# Patient Record
Sex: FEMALE | Race: WHITE
[De-identification: names, ages, dates, MRNs, and addresses within clinical notes are randomized per-mention and may not be internally consistent; named-entity substitution may affect disease eponyms.]

---

## 2019-04-30 ENCOUNTER — HOSPITAL ENCOUNTER (OUTPATIENT)
Dept: HOSPITAL 10 - SDS | Age: 39
Discharge: HOME | End: 2019-04-30
Attending: OBSTETRICS & GYNECOLOGY
Payer: MEDICAID

## 2019-04-30 ENCOUNTER — HOSPITAL ENCOUNTER (OUTPATIENT)
Dept: HOSPITAL 91 - SDS | Age: 39
Discharge: HOME | End: 2019-04-30
Payer: MEDICAID

## 2019-04-30 VITALS — HEART RATE: 60 BPM | DIASTOLIC BLOOD PRESSURE: 70 MMHG | SYSTOLIC BLOOD PRESSURE: 101 MMHG | RESPIRATION RATE: 13 BRPM

## 2019-04-30 VITALS — DIASTOLIC BLOOD PRESSURE: 72 MMHG | SYSTOLIC BLOOD PRESSURE: 104 MMHG | HEART RATE: 66 BPM | RESPIRATION RATE: 14 BRPM

## 2019-04-30 VITALS — HEART RATE: 68 BPM | RESPIRATION RATE: 12 BRPM | SYSTOLIC BLOOD PRESSURE: 111 MMHG | DIASTOLIC BLOOD PRESSURE: 69 MMHG

## 2019-04-30 VITALS — DIASTOLIC BLOOD PRESSURE: 72 MMHG | HEART RATE: 70 BPM | RESPIRATION RATE: 15 BRPM | SYSTOLIC BLOOD PRESSURE: 108 MMHG

## 2019-04-30 VITALS — HEART RATE: 58 BPM | RESPIRATION RATE: 14 BRPM | DIASTOLIC BLOOD PRESSURE: 77 MMHG | SYSTOLIC BLOOD PRESSURE: 108 MMHG

## 2019-04-30 VITALS — HEART RATE: 62 BPM | SYSTOLIC BLOOD PRESSURE: 107 MMHG | RESPIRATION RATE: 13 BRPM | DIASTOLIC BLOOD PRESSURE: 72 MMHG

## 2019-04-30 VITALS — DIASTOLIC BLOOD PRESSURE: 65 MMHG | SYSTOLIC BLOOD PRESSURE: 109 MMHG | RESPIRATION RATE: 15 BRPM | HEART RATE: 60 BPM

## 2019-04-30 VITALS — HEART RATE: 76 BPM | DIASTOLIC BLOOD PRESSURE: 72 MMHG | SYSTOLIC BLOOD PRESSURE: 113 MMHG | RESPIRATION RATE: 16 BRPM

## 2019-04-30 VITALS — RESPIRATION RATE: 12 BRPM | HEART RATE: 60 BPM | SYSTOLIC BLOOD PRESSURE: 109 MMHG | DIASTOLIC BLOOD PRESSURE: 75 MMHG

## 2019-04-30 VITALS — HEART RATE: 63 BPM | RESPIRATION RATE: 16 BRPM | SYSTOLIC BLOOD PRESSURE: 130 MMHG | DIASTOLIC BLOOD PRESSURE: 78 MMHG

## 2019-04-30 VITALS — SYSTOLIC BLOOD PRESSURE: 112 MMHG | RESPIRATION RATE: 15 BRPM | HEART RATE: 84 BPM | DIASTOLIC BLOOD PRESSURE: 72 MMHG

## 2019-04-30 VITALS
WEIGHT: 139.99 LBS | BODY MASS INDEX: 25.76 KG/M2 | WEIGHT: 139.99 LBS | HEIGHT: 62 IN | BODY MASS INDEX: 25.76 KG/M2 | HEIGHT: 62 IN

## 2019-04-30 VITALS — RESPIRATION RATE: 14 BRPM | DIASTOLIC BLOOD PRESSURE: 70 MMHG | HEART RATE: 68 BPM | SYSTOLIC BLOOD PRESSURE: 111 MMHG

## 2019-04-30 VITALS — HEART RATE: 62 BPM | DIASTOLIC BLOOD PRESSURE: 74 MMHG | RESPIRATION RATE: 11 BRPM | SYSTOLIC BLOOD PRESSURE: 108 MMHG

## 2019-04-30 DIAGNOSIS — Z30.2: Primary | ICD-10-CM

## 2019-04-30 LAB
ADD MAN DIFF?: NO
BASOPHIL #: 0 10^3/UL (ref 0–0.1)
BASOPHILS %: 0.3 % (ref 0–2)
EOSINOPHILS #: 0.1 10^3/UL (ref 0–0.5)
EOSINOPHILS %: 1.7 % (ref 0–7)
HEMATOCRIT: 38.6 % (ref 37–47)
HEMOGLOBIN: 12.9 G/DL (ref 12–16)
IMMATURE GRANS #M: 0.02 10^3/UL (ref 0–0.03)
IMMATURE GRANS % (M): 0.3 % (ref 0–0.43)
INR: 1.02
LYMPHOCYTES #: 1.6 10^3/UL (ref 0.8–2.9)
LYMPHOCYTES %: 22.7 % (ref 15–51)
MEAN CORPUSCULAR HEMOGLOBIN: 29.7 PG (ref 29–33)
MEAN CORPUSCULAR HGB CONC: 33.4 G/DL (ref 32–37)
MEAN CORPUSCULAR VOLUME: 88.7 FL (ref 82–101)
MEAN PLATELET VOLUME: 11.3 FL (ref 7.4–10.4)
MONOCYTE #: 0.6 10^3/UL (ref 0.3–0.9)
MONOCYTES %: 8 % (ref 0–11)
NEUTROPHIL #: 4.7 10^3/UL (ref 1.6–7.5)
NEUTROPHILS %: 67 % (ref 39–77)
NUCLEATED RED BLOOD CELLS #: 0 10^3/UL (ref 0–0)
NUCLEATED RED BLOOD CELLS%: 0 /100WBC (ref 0–0)
PARTIAL THROMBOPLASTIN TIME: 33.2 SEC (ref 23–35)
PLATELET COUNT: 242 10^3/UL (ref 140–415)
PROTIME: 13.5 SEC (ref 11.9–14.9)
PT RATIO: 1.1
RED BLOOD COUNT: 4.35 10^6/UL (ref 4.2–5.4)
RED CELL DISTRIBUTION WIDTH: 13.7 % (ref 11.5–14.5)
WHITE BLOOD COUNT: 7 10^3/UL (ref 4.8–10.8)

## 2019-04-30 PROCEDURE — 85610 PROTHROMBIN TIME: CPT

## 2019-04-30 PROCEDURE — 84703 CHORIONIC GONADOTROPIN ASSAY: CPT

## 2019-04-30 PROCEDURE — 85730 THROMBOPLASTIN TIME PARTIAL: CPT

## 2019-04-30 PROCEDURE — 85025 COMPLETE CBC W/AUTO DIFF WBC: CPT

## 2019-04-30 PROCEDURE — 58670 LAPAROSCOPY TUBAL CAUTERY: CPT

## 2019-04-30 RX ADMIN — ACETAMINOPHEN 1 MG: 500 TABLET, FILM COATED ORAL at 14:11

## 2019-04-30 RX ADMIN — BUTORPHANOL TARTRATE 1 MG: 2 INJECTION, SOLUTION INTRAMUSCULAR; INTRAVENOUS at 14:12

## 2019-04-30 RX ADMIN — BUPIVACAINE HYDROCHLORIDE AND EPINEPHRINE BITARTRATE 1 ML: 2.5; .005 INJECTION, SOLUTION EPIDURAL; INFILTRATION; INTRACAUDAL; PERINEURAL at 12:00

## 2019-04-30 RX ADMIN — ONDANSETRON HYDROCHLORIDE 1 MG: 2 INJECTION, SOLUTION INTRAMUSCULAR; INTRAVENOUS at 14:11

## 2019-04-30 RX ADMIN — HYDROMORPHONE HYDROCHLORIDE 1 MG: 2 INJECTION INTRAMUSCULAR; INTRAVENOUS; SUBCUTANEOUS at 14:11

## 2019-04-30 RX ADMIN — ACETAMINOPHEN 1 MG: 500 TABLET, FILM COATED ORAL at 10:59

## 2019-04-30 RX ADMIN — DOXYCYCLINE HYCLATE 1 MG: 100 TABLET, COATED ORAL at 14:11

## 2019-04-30 NOTE — OPR
Operative Report


Planned Procedure


Procedure date


Apr 30, 2019


Procedure(s)


Bilateral tubal fulguration via laparoscope


Performed by


see signature line


Anesthesiologist:  CHELA NOEL


Pre-procedure diagnosis


Multiparity with desire for sterilization


                 [id number]
Anesthesia Type:  [id number]
general








Post-Procedure


Post-procedure diagnosis


Status post bilateral tubal fulguration


Findings


Live Baby [], Apgars [] and [], weight [], position [], [] presentation []cord.


Estimated Blood Loss:  minimal


Specimen(s)


none


Grafts/Implant(s)


none


Complication(s)


none


Pt Condition post procedure:  stable


Disposition:  PACU


Procedure Description


The patient was placed on the OR table in the supine position.  General 


anesthesia was induced.  The patient was turned into lithotomy position for 


vaginal and laparoscopic procedure specifically.  Perineal, vaginal, and 


abdominal area were then prepped with Betadine and draped for a usual laparoscop


ic procedure and a vaginal procedure.  A Land catheter was then inserted into 


urinary bladder under aseptic condition in operating room and under satisfactory


 anesthesia, a small speculum was inserted into vagina.  Anterior lip of the 


cervix was secured with a tenaculum.  Cervix was brought down to operative 


field.  It was progressively dilated to #6 Hegar.  A HUMI elevator was inserted 


into cervical canal and afterwards uterine cavity.  After insufflation of the 


tube all the other instruments were removed from vaginal cavity.  After changing


 gloves, turning to abdominal side, a small incision was placed just below belly


 button 0.5 cm in length.  A 0.5 cm trocar was introduced inside the incision.  


The trocar was blunt and pointing toward the uterine dome.  The trocar was 


easily inserted inside the abdominal.  A laparoscope was then inserted into the 


abdominal cavity, making sure the correct cavity was entered.  Intra-abdominal 


cavity was insufflated with CO2.  Under direct visualization a small incision 


was made a 0.5 cm in length about 2 to 3 fingerbreadths above and parallel to 


the symphysis pubis.  A 0.5 cm trocar was then introduced inside the incision.  


Under direct visualization the second probe was also inserted into abdominal 


cavity easily.  The uterus and fallopian tubes were easily identified.  Right 


fallopian tube was approached first and at least 5 cm of the tube was adequately


 fulgurated, making sure no live tissue was left in between.  The same procedure


 was done on the left side.  Serious care was taken to avoid bowel, bladder, or 


other intra-abdominal organ injury.  At this point, procedure was terminated.  


The trocar incision sites from inside the abdomen on either side were observed. 


 No bleeding was observed.  After removing the laparoscope, the abdomen was 


desufflated to its normal position.  Afterwards, all of the trocar sleeves were 


removed.  Abdominal incisions were closed using staples.  The HUMI was then 


discontinued.  Also, Land was taken out.  The patient was returned to supine 


position.  Estimated blood loss was less than 5 mL.  The patient tolerated the 


procedure very well and was transferred to postanesthesia recovery room in 


stable and good condition.











BETITO VILLA MD   Apr 30, 2019 13:24

## 2019-04-30 NOTE — PREAC
Date/Time of Note


Date/Time of Note


DATE: 4/30/19 


TIME: 11:25





Anesthesia Eval and Record


Evaluation


Time Pre-Procedure Interview


DATE: 4/30/19 


TIME: 11:25


Age


38


Sex


female


NPO:  8 hrs


Preoperative diagnosis


elective sterilization


Planned procedure


Laparoscopic tubal ligation





Past Medical History


Past Medical History:  None





Surgery & Anesthesia Issues


No known issue





Meds


Anticoagulation:  No


Beta Blocker within 24 hr:  No


Reason Beta Blocker not given:  Pt. not on B-Blocker


Discontinued Reported Medications


Prenatal Vit-Iron Fumarate-FA (Prenatal Tablet) 1 Each Tablet, 1 TAB PO DAILY, 


TAB


   11/12/14


Meds reviewed:  Yes





Allergies


Coded Allergies:  


     ibuprofen (Verified  Allergy, Unknown, RASH, PRURITIS, 4/30/19)


Allergies Reviewed:  Yes





Labs/Studies


Labs Reviewed:  Reviewed by anesthesiologist





Pregnancy test:  Negative





Pre-procedure Exam


Last vitals





Vital Signs


  Date      Temp  Pulse  Resp  B/P (MAP)   Pulse Ox  O2          O2 Flow    FiO2


Time                                                 Delivery    Rate


   4/30/19  97.5     63    16      130/78        97  Room Air


     11:15                           (95)





Airway:  Adequate mouth opening, Adequate thyromental dist


Mallampati:  Mallampati II


Teeth:  Normal


Lung:  Normal


Heart:  Normal





ASA Physical Status


ASA physical status:  1


Emergency:  None





Planned Anesthetic


General/MAC:  ETT





Pre-operative Attestations


Prior to commencing anesthesia and surgery, the patient was re-evaluated, there 


was verification of:


*The patient's identity


*The results of appropriate recent lab work and preoperative vital signs


*The above evaluation not changing prior to induction


*Anesthetic plan, risk benefits, alternative and complications discussed with 


patient/family; questions answered; patient/family understands, accepts and 


wishes to proceed.











CHELA NOEL                  Apr 30, 2019 11:28

## 2019-04-30 NOTE — PAC
Date/Time of Note


Date/Time of Note


DATE: 4/30/19 


TIME: 13:38





Post-Anesthesia Notes


Post-Anesthesia Note


Last documented vital signs





Vital Signs


  Date     Temp     Pulse  Resp   B/P (MAP)  Pulse Ox  O2         O2 Flow   FiO2


Time                                                   Delivery   Rate


  4/30/19  97.5
98  63
89  16
16     130/78    97
100  Room


11:15
133                         (95)
111/            Air
face


        3                                76            mask 6L





Activity:  WNL


Respiratory function:  WNL


Cardiovascular function:  WNL


Mental status:  Baseline


Pain reasonably controlled:  Yes


Hydration appropriate:  Yes


Nausea/Vomiting absent:  Yes











CHELA NOEL                  Apr 30, 2019 13:39

## 2019-04-30 NOTE — HP
Date/Time of Note


Date/Time of Note


DATE: 4/30/19 


TIME: 12:22





Assessment/Plan


VTE Prophylaxis


Pharmacological prophylaxis:  NA/contraindicated


Pharm contraindication:  low risk/ambulating





Lines/Catheters


IV Catheter Type (from Nrsg):  Saline Lock





Assessment/Plan


Assessment/Plan


Voluntary sterilization


We will proceed with laparoscopic tubal ligation versus mini lap tubal leg


Result Diagram:  


4/30/19 1050





Results 24hrs





Laboratory Tests


               Test
                                4/30/19
10:50


               White Blood Count                           7.0  #


               Red Blood Count                            4.35  #


               Hemoglobin                                 12.9  #


               Hematocrit                                 38.6  #


               Mean Corpuscular Volume                     88.7


               Mean Corpuscular Hemoglobin                 29.7


               Mean Corpuscular Hemoglobin
Concent        33.4  



               Red Cell Distribution Width                 13.7


               Platelet Count                              242  #


               Mean Platelet Volume                       11.3  H


               Immature Granulocytes %                    0.300


               Neutrophils %                               67.0


               Lymphocytes %                               22.7


               Monocytes %                                  8.0


               Eosinophils %                                1.7


               Basophils %                                  0.3


               Nucleated Red Blood Cells %                  0.0


               Immature Granulocytes #                    0.020


               Neutrophils #                                4.7


               Lymphocytes #                                1.6


               Monocytes #                                  0.6


               Eosinophils #                                0.1


               Basophils #                                  0.0


               Nucleated Red Blood Cells #                  0.0


               Prothrombin Time                            13.5


               Prothrombin Time Ratio                       1.1


               INR International Normalized
Ratio         1.02  



               Activated Partial
Thromboplast Time        33.2  









HPI/ROS


Admit Date/Time


Admit Date/Time


4/30 2019





Hx of Present Illness


38-year-old female para 5  admitted for elective sterilization





ROS


Currently has no complaints


Constitutional:  no complaints, improved


Eyes:  no complaints


ENT:  no complaints


Respiratory:  no complaints


Cardiovascular:  no complaints


Gastrointestinal:  no complaints


Genitourinary:  no complaints


Musculoskeletal:  no complaints


Skin:  no complaints


Neurologic:  no complaints


Endocrine:  no complaints


Lymphatic:  no complaints


Psychological:  no complaints, nl mood/affect


Immunologic:  no complaints





PMH/Family/Social


Past Medical History


Medical History:  no pertinent history


Medications





Current Medications


Morphine Sulfate (morphine (REC)) 2 mg PACU ORDER PRN IV MILD PAIN 1-3;  Start 


4/30/19 at 11:30;  Stop 4/30/19 at 19:00


Morphine Sulfate (morphine (REC)) 4 mg PACU ORDER PRN IV MOD PAIN 4-6;  Start 


4/30/19 at 11:30;  Stop 4/30/19 at 19:00


Hydromorphone HCl (Dilaudid) 0.2 mg PACU PRN IV MILD PAIN 1-3;  Start 4/30/19 at


11:30;  Stop 4/30/19 at 19:00


Hydromorphone HCl (Dilaudid) 0.4 mg PACU PRN IV MOD PAIN 4-6;  Start 4/30/19 at 


11:30;  Stop 4/30/19 at 19:00


Hydromorphone HCl (Dilaudid) 0.6 mg PACU PRN IV SEVERE PAIN 7-10;  Start 4/30/19


at 11:30;  Stop 4/30/19 at 19:00


Fentanyl (Sublimaze) 25 mcg PACU ORDER PRN IV MILD PAIN 1-3;  Start 4/30/19 at 


11:30;  Stop 4/30/19 at 19:00


Fentanyl (Sublimaze) 50 mcg PACU ORDER PRN IV MOD PAIN 4-6;  Start 4/30/19 at 


11:30;  Stop 4/30/19 at 19:00


Oxycodone/ Acetaminophen (Percocet (5/ 325)) 1 tab PACU ORDER PRN PO .PAIN 1-5; 


Start 4/30/19 at 11:30;  Stop 4/30/19 at 19:00


Oxycodone/ Acetaminophen (Percocet (5/ 325)) 2 tab PACU ORDER PRN PO .PAIN 6-10;


 Start 4/30/19 at 11:30;  Stop 4/30/19 at 19:00


Ondansetron HCl (Zofran Inj) 4 mg PACU ORDER PRN IV NAUSEA/VOMITING;  Start 


4/30/19 at 11:30;  Stop 4/30/19 at 19:00


Labetalol HCl (Labetalol) 5 mg PACU ORDER PRN IV HIGH BLOOD PRESSURE;  Start 


4/30/19 at 11:30;  Stop 4/30/19 at 19:00


Albuterol (Proventil 0.083% (Neb)) 2.5 mg PACU ORDER PRN HHN .WHEEZING;  Start 


4/30/19 at 11:30;  Stop 4/30/19 at 19:00


Meperidine HCl (Demerol) 25 mg PACU ORDER PRN IV .RIGORS;  Start 4/30/19 at 


11:30;  Stop 4/30/19 at 19:00


Diphenhydramine HCl (Benadryl) 25 mg PACU ORDER PRN IV .PRURITUS;  Start 4/30/19


at 11:30;  Stop 4/30/19 at 19:00


Coded Allergies:  


     ibuprofen (Verified  Allergy, Unknown, RASH, PRURITIS, 4/30/19)





Past Surgical History


Past Surgical Hx:  no surgical history





Social History


Alcohol Use:  none


Smoking Status:  Never smoker


Drug Use:  none





Exam/Review of Systems


Vital Signs


Vitals





Vital Signs


  Date      Temp  Pulse  Resp  B/P (MAP)   Pulse Ox  O2          O2 Flow    FiO2


Time                                                 Delivery    Rate


   4/30/19  97.5     63    16      130/78        97  Room Air


     11:15                           (95)








Exam


Exam


38-year-old female does not appear in any acute distress


Constitutional:  alert, oriented, well developed


Psych:  no complaints, nl mood/affect


Head:  normocephalic, atraumatic


Eyes:  nl conjunctiva, EOMI, nl lids, nl sclera, PERRL


ENMT:  nl external ears & nose, nl lips & teeth, nl nasal mucosa & septum


Neck:  supple, non-tender


Respiratory:  clear to auscultation, normal air movement


Cardiovascular:  regular rate and rhythm, nl pulses


Gastrointestinal:  soft, nl liver, spleen, non-tender


Musculoskeletal:  nl extremities to inspection


Extremities:  normal pulses


Neurological:  CNS II-XII intact, nl mental status, nl speech, nl strength


Skin:  nl turgor; 


   No rash or lesions


Lymph:  nl lymph nodes











BETITO VILLA MD   Apr 30, 2019 12:24